# Patient Record
Sex: MALE | Race: WHITE | URBAN - METROPOLITAN AREA
[De-identification: names, ages, dates, MRNs, and addresses within clinical notes are randomized per-mention and may not be internally consistent; named-entity substitution may affect disease eponyms.]

---

## 2022-02-18 ENCOUNTER — OFFICE VISIT (OUTPATIENT)
Dept: URBAN - METROPOLITAN AREA CLINIC 33 | Facility: CLINIC | Age: 32
End: 2022-02-18
Payer: COMMERCIAL

## 2022-02-18 DIAGNOSIS — T15.01XA FOREIGN BODY IN CORNEA, RIGHT EYE, INITIAL ENCOUNTER: Primary | ICD-10-CM

## 2022-02-18 PROCEDURE — 65222 REMOVE FOREIGN BODY FROM EYE: CPT | Performed by: OPTOMETRIST

## 2022-02-18 RX ORDER — NEOMYCIN SULFATE, POLYMYXIN B SULFATE AND DEXAMETHASONE 3.5; 10000; 1 MG/ML; [USP'U]/ML; MG/ML
SUSPENSION OPHTHALMIC
Qty: 5 | Refills: 1 | Status: ACTIVE
Start: 2022-02-18

## 2022-02-18 ASSESSMENT — INTRAOCULAR PRESSURE
OS: 12
OD: 13

## 2022-02-25 ENCOUNTER — OFFICE VISIT (OUTPATIENT)
Dept: URBAN - METROPOLITAN AREA CLINIC 33 | Facility: CLINIC | Age: 32
End: 2022-02-25
Payer: COMMERCIAL

## 2022-02-25 DIAGNOSIS — T15.01XD FOREIGN BODY IN CORNEA, RIGHT EYE, SUBSEQUENT ENCOUNTER: Primary | ICD-10-CM

## 2022-02-25 PROCEDURE — 99212 OFFICE O/P EST SF 10 MIN: CPT | Performed by: OPTOMETRIST

## 2022-02-25 ASSESSMENT — INTRAOCULAR PRESSURE
OD: 12
OS: 14

## 2022-02-25 NOTE — IMPRESSION/PLAN
Impression: Foreign body in cornea, right eye, subsequent encounter: T15.01XD. Plan: Discussed need for antibiotic, patient did not get the medication. 
Start Maxitrol TID OD